# Patient Record
Sex: FEMALE | Race: WHITE
[De-identification: names, ages, dates, MRNs, and addresses within clinical notes are randomized per-mention and may not be internally consistent; named-entity substitution may affect disease eponyms.]

---

## 2018-03-28 ENCOUNTER — HOSPITAL ENCOUNTER (EMERGENCY)
Dept: HOSPITAL 52 - LL.ED | Age: 18
Discharge: HOME | End: 2018-03-28
Payer: COMMERCIAL

## 2018-03-28 DIAGNOSIS — Z88.1: ICD-10-CM

## 2018-03-28 DIAGNOSIS — Z88.5: ICD-10-CM

## 2018-03-28 DIAGNOSIS — F19.20: ICD-10-CM

## 2018-03-28 DIAGNOSIS — K21.9: ICD-10-CM

## 2018-03-28 DIAGNOSIS — E87.6: ICD-10-CM

## 2018-03-28 DIAGNOSIS — N12: Primary | ICD-10-CM

## 2018-03-28 DIAGNOSIS — F17.210: ICD-10-CM

## 2018-03-28 DIAGNOSIS — Z79.899: ICD-10-CM

## 2018-03-28 DIAGNOSIS — E66.9: ICD-10-CM

## 2018-03-28 LAB
CHLORIDE SERPL-SCNC: 99 MMOL/L (ref 98–107)
SODIUM SERPL-SCNC: 136 MMOL/L (ref 136–145)

## 2018-03-28 PROCEDURE — G0480 DRUG TEST DEF 1-7 CLASSES: HCPCS

## 2018-03-28 NOTE — EDM.PDOC
ED HPI GENERAL MEDICAL PROBLEM





- General


Chief Complaint: General


Stated Complaint: L sided swelling, "abnormal labs"


Time Seen by Provider: 03/28/18 14:00


Source of Information: Reports: Patient, Family


History Limitations: Reports: Uncooperative (vague history, not giving 

consistent answers to questions/history review. )





- History of Present Illness


INITIAL COMMENTS - FREE TEXT/NARRATIVE: 





Patient is brought to the ER by her mother for complaint of not feeling well.  

Has not been seen by her mother for approximately one month but showed up at 

home several days ago complaining of not feeling well.  Long history of drug use

/abuse/dependency.  Was in inpatient drug rehab last year.  At first she said 

for 6 months, later she said for 8 months. Reports she was discharged from 

rehab 6 months ago. 





Patient has no job.  Currently not working with a  because the 

place she usually works with is owed money by the patient. 





Mom says patient has been staying with friends/moving around to different 

homes. 


Patient claims she spends most of her time living with her grandfather. Mother 

disputes this. 





During initial ROS, patient admitted to using THC, denied other drug use. 

Admitted to drinking ETOH "on weekends''. 





Very difficult to get timeframe of current illness, such as when symptoms 

started. Patient vague, said "I don't know". Eventually she said that she was 

having symptoms for 4 days. No eye contact during initial history made by 

patient. 





She did complain of left sided mid back pain, non-radiating. Some nausea. No 

emesis/bowel changes. Uncertain if she had high fevers.  Denied taking any 

medications at home such as Tylenol or Ibuprofen to nurse. 


Also complaint of some dysuria. 


Menstrual cycle started today. 





Said PMH + for GERD, Herpes





Was seen at [facility] in [place] yesterday. She was given Rocephin IM and 

Rx for Septra. Mom says that patient "disappeared" after the appointment and 

did not  the medication. When asked why she didn't  the medicine, 

the patient told nurse it was because she was "tired". 





Negative Hep C screen yesterday. UC pending. Clinic at that time was worried 

about possible pyelonephritis





- Related Data


 Allergies











Allergy/AdvReac Type Severity Reaction Status Date / Time


 


ciprofloxacin [From Cipro] Allergy  Hives Verified 03/28/18 13:25


 


morphine Allergy  Anaphylactic Verified 03/28/18 14:02





   Shock  











Home Meds: 


 Home Meds





valACYclovir HCl [Valtrex] 500 mg PO DAILY PRN 05/27/14 [History]


Acetaminophen [Tylenol] 650 mg PO Q4HR PRN 03/28/18 [History]


Famotidine [Pepcid AC] 10 mg PO DAILY PRN 03/28/18 [History]


Ibuprofen 400 mg PO Q6HR PRN 03/28/18 [History]


Sulfamethoxazole/Trimethoprim [Septra DS] 1 tab PO BID 03/28/18 [History]











Past Medical History


Gastrointestinal History: Reports: Chronic Constipation, GERD


Psychiatric History: Reports: Addiction


Endocrine/Metabolic History: Reports: Obesity/BMI 30+





- Infectious Disease History


Infectious Disease History: Reports: Herpes





Social & Family History





- Tobacco Use


Smoking Status *Q: Current Every Day Smoker


Tobacco Use Within Last Twelve Months: Cigarettes


Years of Tobacco use: 4


Packs/Tins Daily: 0.5


Used Tobacco, but Quit: No


Smoking Cessation Information Provided To Patient: Patient Refused


Second Hand Smoke Exposure: Yes





- Alcohol Use


Alcohol Use History: Yes


Days Per Week of Alcohol Use: 1 (Denies abuse however initial intoxication at 

age 9-10 by her history)


Alcohol Use in Last Twelve Months: Yes


Alcohol Use Comment: Vague with history/frequency





- Recreational Drug Use


Recreational Drug Use: Yes


Recreational Drug Type: Reports: Cocaine, Marijuana/Hashish, Methamphetamine


Recreational Drug Use Frequency: Daily





- Living Situation & Occupation


Living situation: Reports: with Family


Occupation: Student





ED ROS GENERAL





- Review of Systems


Review Of Systems: See Below


Constitutional: Reports: Fever, Malaise, Weight Loss (Mom reports patient has 

lost weight over the last month).  Denies: Diaphoresis


HEENT: Reports: No Symptoms


Respiratory: Reports: No Symptoms.  Denies: Shortness of Breath, Wheezing, 

Pleuritic Chest Pain, Cough


Cardiovascular: Reports: No Symptoms.  Denies: Chest Pain


GI/Abdominal: Reports: Nausea.  Denies: Abdominal Pain, Black Stool, Bloody 

Stool, Difficulty Swallowing, Distension, Vomiting


: Reports: Dysuria, Flank Pain, Frequency, Hematuria, Urgency


Musculoskeletal: Reports: No Symptoms


Skin: Reports: Other (scattered small errosions/scabs over body, patient says 

due to Herpes)


Neurological: Reports: No Symptoms


Psychiatric: Reports: No Symptoms


Hematologic/Lymphatic: Reports: No Symptoms


Immunologic: Reports: No Symptoms





ED EXAM, GENERAL





- Physical Exam


Exam: See Below


Exam Limited By: No Limitations


General Appearance: Alert, Obese, Other (restless, very unkept in appearance, 

holes in clothes)


Eye Exam: Bilateral Eye: EOMI, PERRL


Ears: Normal External Exam, Normal Canal, Hearing Grossly Normal, Normal TMs


Nose: Normal Inspection.  No: Nasal Swelling, Nasal Drainage


Throat/Mouth: Normal Lips, Normal Oropharynx, Normal Voice, No Airway Compromise

, Other (no significant dental disease noted)


Head: Atraumatic, Normocephalic


Neck: Normal Inspection, Supple, Non-Tender, Full Range of Motion.  No: 

Lymphadenopathy (L), Lymphadenopathy (R)


Respiratory/Chest: No Respiratory Distress, Lungs Clear, Normal Breath Sounds, 

No Accessory Muscle Use, Chest Non-Tender


Cardiovascular: Normal Peripheral Pulses, Tachycardia


Peripheral Pulses: 2+: Radial (L), Radial (R), Dorsalis Pedis (L), Dorsalis 

Pedis (R)


GI/Abdominal: Normal Bowel Sounds, Soft, No Distention, Tender (mild tenderness 

with palpation of central abdomen).  No: Distended, Guarding, Rigid, Rebound


 (Female) Exam: Deferred


Rectal (Female) Exam: Deferred


Back Exam: CVA Tenderness (L) (mild).  No: CVA Tenderness (R), Muscle Spasm, 

Paraspinal Tenderness, Vertebral Tenderness


Extremities: Normal Inspection, Normal Range of Motion, Non-Tender, No Pedal 

Edema, Normal Capillary Refill


Neurological: Alert, Oriented, Normal Reflexes, No Motor/Sensory Deficits


Psychiatric: Anxious


Skin Exam: Warm, Dry, Rash (Scattered small round errosions/scabs noted on face/

neck/trunk/limbs bilaterally. No active drainage/pustules noted. )


Lymphatic: No Adenopathy





Course





- Vital Signs


Last Recorded V/S: 


 Last Vital Signs











Temp  37.9 C   03/28/18 15:46


 


Pulse  97   03/28/18 15:46


 


Resp  16   03/28/18 15:46


 


BP  96/52 L  03/28/18 15:46


 


Pulse Ox  100   03/28/18 15:46














- Orders/Labs/Meds


Orders: 


 Active Orders 24 hr











 Category Date Time Status


 


 Abdomen Pelvis wo Cont [CT] Stat Exams  03/28/18 15:05 Taken











Labs: 


 Laboratory Tests











  03/28/18 03/28/18 03/28/18 Range/Units





  14:01 14:15 14:15 


 


WBC   13.8 H   (4.0-10.2)  K/uL


 


RBC   4.22   (3.77-5.09)  M/uL


 


Hgb   13.0   (11.7-15.5)  g/dL


 


Hct   37.8   (34.0-46.0)  %


 


MCV   89.6   (84.0-98.0)  fL


 


MCH   30.8   (28.2-33.3)  pg


 


MCHC   34.4   (31.7-36.0)  g/dL


 


RDW   13.2   (11.2-14.1)  %


 


Plt Count   172  D   (150-350)  K/uL


 


Neut % (Auto)   81.7 H   (45.0-80.0)  %


 


Lymph % (Auto)   8.2 L   (10.0-50.0)  %


 


Mono % (Auto)   9.9   (2.0-14.0)  %


 


Eos % (Auto)   0.1   (0.0-5.0)  %


 


Baso % (Auto)   0.1   (0.0-2.0)  %


 


Neut # (Auto)   11.25 H   (1.40-7.00)  K/uL


 


Lymph # (Auto)   1.13   (0.50-3.50)  K/uL


 


Mono # (Auto)   1.36 H   (0.00-1.00)  K/uL


 


Eos # (Auto)   0.01   (0.00-0.50)  K/uL


 


Baso # (Auto)   0.02   (0.00-0.20)  K/uL


 


Sodium  136    (136-145)  mmol/L


 


Potassium  2.9 L*    (3.5-5.1)  mmol/L


 


Chloride  99    ()  mmol/L


 


Carbon Dioxide  28.0    (21.0-32.0)  mmol/L


 


BUN  6 L    (7-18)  mg/dL


 


Creatinine  0.66    (0.51-1.17)  mg/dL


 


Est Cr Clr Drug Dosing  5.09    mL/min


 


Estimated GFR (MDRD)  > 60    mL/min


 


Glucose  92    ()  mg/dL


 


Lactic Acid     (0.4-2.0)  mmol/L


 


Calcium  8.3 L    (8.5-10.1)  mg/dL


 


Total Bilirubin  0.5    (0.2-1.0)  mg/dL


 


AST  12 L    (15-37)  U/L


 


ALT  11 L    (12-78)  U/L


 


Alkaline Phosphatase  106    ()  IU/L


 


Total Protein  7.3    (6.4-8.2)  g/dL


 


Albumin  2.7 L    (3.4-5.0)  g/dL


 


Specimen Type     


 


Urine Color     


 


Urine Appearance     


 


Urine pH     (5.0-9.0)  


 


Ur Specific Gravity     (1.005-1.030)  


 


Urine Protein     (NEGATIVE)  mg/dL


 


Urine Glucose (UA)     (NEGATIVE)  mg/dL


 


Urine Ketones     (NEGATIVE)  mg/dL


 


Urine Occult Blood     (NEGATIVE)  


 


Urine Nitrite     (NEGATIVE)  


 


Urine Bilirubin     (NEGATIVE)  


 


Urine Urobilinogen     (0.2-1.0)  E.U./dL


 


Ur Leukocyte Esterase     (NEGATIVE)  


 


Urine RBC     /HPF


 


Urine WBC     /HPF


 


Ur Epithelial Cells     /LPF


 


Urine Bacteria     (NONE TO FEW)  /HPF


 


Urine HCG, Qual     


 


Urine Opiates Screen     (NEGATIVE)  


 


Ur Barbiturates Screen     (NEGATIVE)  


 


Ur Tricyclics Screen     (NEGATIVE)  


 


Ur Phencyclidine Scrn     (NEGATIVE)  


 


Ur Amphetamine Screen     (NEGATIVE)  


 


U Methamphetamines Scrn     (NEGATIVE)  


 


U Benzodiazepines Scrn     (NEGATIVE)  


 


U Cocaine Metab Screen     (NEGATIVE)  


 


U Marijuana (THC) Screen     (NEGATIVE)  


 


Ethyl Alcohol    0.000  (0.000-0.080)  g/dL














  03/28/18 03/28/18 03/28/18 Range/Units





  14:15 14:30 14:30 


 


WBC     (4.0-10.2)  K/uL


 


RBC     (3.77-5.09)  M/uL


 


Hgb     (11.7-15.5)  g/dL


 


Hct     (34.0-46.0)  %


 


MCV     (84.0-98.0)  fL


 


MCH     (28.2-33.3)  pg


 


MCHC     (31.7-36.0)  g/dL


 


RDW     (11.2-14.1)  %


 


Plt Count     (150-350)  K/uL


 


Neut % (Auto)     (45.0-80.0)  %


 


Lymph % (Auto)     (10.0-50.0)  %


 


Mono % (Auto)     (2.0-14.0)  %


 


Eos % (Auto)     (0.0-5.0)  %


 


Baso % (Auto)     (0.0-2.0)  %


 


Neut # (Auto)     (1.40-7.00)  K/uL


 


Lymph # (Auto)     (0.50-3.50)  K/uL


 


Mono # (Auto)     (0.00-1.00)  K/uL


 


Eos # (Auto)     (0.00-0.50)  K/uL


 


Baso # (Auto)     (0.00-0.20)  K/uL


 


Sodium     (136-145)  mmol/L


 


Potassium     (3.5-5.1)  mmol/L


 


Chloride     ()  mmol/L


 


Carbon Dioxide     (21.0-32.0)  mmol/L


 


BUN     (7-18)  mg/dL


 


Creatinine     (0.51-1.17)  mg/dL


 


Est Cr Clr Drug Dosing     mL/min


 


Estimated GFR (MDRD)     mL/min


 


Glucose     ()  mg/dL


 


Lactic Acid  1.4    (0.4-2.0)  mmol/L


 


Calcium     (8.5-10.1)  mg/dL


 


Total Bilirubin     (0.2-1.0)  mg/dL


 


AST     (15-37)  U/L


 


ALT     (12-78)  U/L


 


Alkaline Phosphatase     ()  IU/L


 


Total Protein     (6.4-8.2)  g/dL


 


Albumin     (3.4-5.0)  g/dL


 


Specimen Type   Urinblad   


 


Urine Color   Dark yellow   


 


Urine Appearance   Slightly cloudy   


 


Urine pH   5.5   (5.0-9.0)  


 


Ur Specific Gravity   1.020   (1.005-1.030)  


 


Urine Protein   100 H   (NEGATIVE)  mg/dL


 


Urine Glucose (UA)   Negative   (NEGATIVE)  mg/dL


 


Urine Ketones   Trace H   (NEGATIVE)  mg/dL


 


Urine Occult Blood   Large H   (NEGATIVE)  


 


Urine Nitrite   Negative   (NEGATIVE)  


 


Urine Bilirubin   Small H   (NEGATIVE)  


 


Urine Urobilinogen   1.0   (0.2-1.0)  E.U./dL


 


Ur Leukocyte Esterase   Trace H   (NEGATIVE)  


 


Urine RBC   50-75 H   /HPF


 


Urine WBC   10-20 H   /HPF


 


Ur Epithelial Cells   Few   /LPF


 


Urine Bacteria   Few   (NONE TO FEW)  /HPF


 


Urine HCG, Qual    Negative  


 


Urine Opiates Screen     (NEGATIVE)  


 


Ur Barbiturates Screen     (NEGATIVE)  


 


Ur Tricyclics Screen     (NEGATIVE)  


 


Ur Phencyclidine Scrn     (NEGATIVE)  


 


Ur Amphetamine Screen     (NEGATIVE)  


 


U Methamphetamines Scrn     (NEGATIVE)  


 


U Benzodiazepines Scrn     (NEGATIVE)  


 


U Cocaine Metab Screen     (NEGATIVE)  


 


U Marijuana (THC) Screen     (NEGATIVE)  


 


Ethyl Alcohol     (0.000-0.080)  g/dL














  03/28/18 03/28/18 Range/Units





  14:30 20:45 


 


WBC    (4.0-10.2)  K/uL


 


RBC    (3.77-5.09)  M/uL


 


Hgb    (11.7-15.5)  g/dL


 


Hct    (34.0-46.0)  %


 


MCV    (84.0-98.0)  fL


 


MCH    (28.2-33.3)  pg


 


MCHC    (31.7-36.0)  g/dL


 


RDW    (11.2-14.1)  %


 


Plt Count    (150-350)  K/uL


 


Neut % (Auto)    (45.0-80.0)  %


 


Lymph % (Auto)    (10.0-50.0)  %


 


Mono % (Auto)    (2.0-14.0)  %


 


Eos % (Auto)    (0.0-5.0)  %


 


Baso % (Auto)    (0.0-2.0)  %


 


Neut # (Auto)    (1.40-7.00)  K/uL


 


Lymph # (Auto)    (0.50-3.50)  K/uL


 


Mono # (Auto)    (0.00-1.00)  K/uL


 


Eos # (Auto)    (0.00-0.50)  K/uL


 


Baso # (Auto)    (0.00-0.20)  K/uL


 


Sodium    (136-145)  mmol/L


 


Potassium   3.1 L  (3.5-5.1)  mmol/L


 


Chloride    ()  mmol/L


 


Carbon Dioxide    (21.0-32.0)  mmol/L


 


BUN    (7-18)  mg/dL


 


Creatinine    (0.51-1.17)  mg/dL


 


Est Cr Clr Drug Dosing    mL/min


 


Estimated GFR (MDRD)    mL/min


 


Glucose    ()  mg/dL


 


Lactic Acid    (0.4-2.0)  mmol/L


 


Calcium    (8.5-10.1)  mg/dL


 


Total Bilirubin    (0.2-1.0)  mg/dL


 


AST    (15-37)  U/L


 


ALT    (12-78)  U/L


 


Alkaline Phosphatase    ()  IU/L


 


Total Protein    (6.4-8.2)  g/dL


 


Albumin    (3.4-5.0)  g/dL


 


Specimen Type    


 


Urine Color    


 


Urine Appearance    


 


Urine pH    (5.0-9.0)  


 


Ur Specific Gravity    (1.005-1.030)  


 


Urine Protein    (NEGATIVE)  mg/dL


 


Urine Glucose (UA)    (NEGATIVE)  mg/dL


 


Urine Ketones    (NEGATIVE)  mg/dL


 


Urine Occult Blood    (NEGATIVE)  


 


Urine Nitrite    (NEGATIVE)  


 


Urine Bilirubin    (NEGATIVE)  


 


Urine Urobilinogen    (0.2-1.0)  E.U./dL


 


Ur Leukocyte Esterase    (NEGATIVE)  


 


Urine RBC    /HPF


 


Urine WBC    /HPF


 


Ur Epithelial Cells    /LPF


 


Urine Bacteria    (NONE TO FEW)  /HPF


 


Urine HCG, Qual    


 


Urine Opiates Screen  Negative   (NEGATIVE)  


 


Ur Barbiturates Screen  Negative   (NEGATIVE)  


 


Ur Tricyclics Screen  Negative   (NEGATIVE)  


 


Ur Phencyclidine Scrn  Negative   (NEGATIVE)  


 


Ur Amphetamine Screen  Positive H   (NEGATIVE)  


 


U Methamphetamines Scrn  Positive H   (NEGATIVE)  


 


U Benzodiazepines Scrn  Negative   (NEGATIVE)  


 


U Cocaine Metab Screen  Negative   (NEGATIVE)  


 


U Marijuana (THC) Screen  Positive H   (NEGATIVE)  


 


Ethyl Alcohol    (0.000-0.080)  g/dL











Meds: 


Medications














Discontinued Medications














Generic Name Dose Route Start Last Admin





  Trade Name Freq  PRN Reason Stop Dose Admin


 


Acetaminophen  650 mg  03/28/18 13:45  03/28/18 13:48





  Tylenol  PO  03/28/18 13:46  650 mg





  NOW ONE   Administration





     





     





     





     


 


Ceftriaxone Sodium  1 gm  03/28/18 15:12  03/28/18 16:03





  Rocephin  IM  03/28/18 15:13  1 gm





  ONETIME ONE   Administration





     





     





     





     


 


Lidocaine HCl  Confirm  03/28/18 15:30  03/28/18 16:04





  Xylocaine-Mpf 1%  Administered  03/28/18 15:31  5 ml





  Dose   Administration





  5 ml   





  .ROUTE   





  .STK-MED ONE   





     





     





     





     


 


Lorazepam  1 mg  03/28/18 15:11  03/28/18 15:44





  Ativan  PO  03/28/18 15:12  1 mg





  ONETIME ONE   Administration





     





     





     





     


 


Potassium Chloride  40 meq  03/28/18 14:49  03/28/18 15:11





  Klor-Con M20  PO  03/28/18 14:50  40 meq





  ONETIME ONE   Administration





     





     





     





     


 


Potassium Chloride  20 meq  03/28/18 17:00  03/28/18 16:45





  Klor-Con 10  PO  03/28/18 17:01  20 meq





  ONETIME ONE   Administration





     





     





     





     


 


Potassium Chloride  20 meq  03/28/18 19:00  03/28/18 19:06





  Klor-Con 10  PO  03/28/18 19:01  20 meq





  ONETIME ONE   Administration





     





     





     





     


 


Potassium Chloride  40 meq  03/28/18 21:09  





  Klor-Con M20  PO  03/28/18 21:10  





  ONETIME ONE   





     





     





     





     














- Radiology Interpretation


Free Text/Narrative:: 





No acute findings on abdominal/pelvic CT other than some changes consistent 

with constipation per Radiology. 


CT Results Date: 03/28/18


CT Results Time: 17:00





- Re-Assessments/Exams


Free Text/Narrative Re-Assessment/Exam: 





03/28/18 16:19


+ Meth and THC.


Normal lactic acid. WBC elevated but improved compared to level noted by 

[facility] yesterday. K low at 2.9





CT of abd/pelvis ordered to rule out kidney stone. 








Patient admitted to use when confronted. Has no interest in going to treatment, 

saying she had "enough" treatment last year during her 6-8 month stint as 

inpatient and knows what to do to stay clean. 





 Discussed ongoing hematuria/pyuria and lack of compliance in picking up 

prescribed antibiotics yesterday.  Discussed low K noted today. Patient made 

aware that both low potassium and UTIs/Pyelonepritis can be lethal if left 

untreated. Patient promised that she would be compliant with treatment course. 


Refused IV.  Refused to see local  saying that she "needed to get 

out of this town and get her own place/job" and felt as though that would help 

her achieve sobriety. 





Was OK with getting IM Rocephin despite her saying that she hated needles and 

did not want an IV.


She was observed to be able to drink water easily. PO hydration encouraged.


Plan formulated for PO Potassium supplementation over 6 hours with recheck of 

Potassium level at that time. Patient wants to go home. 


Ativan given. Single lab draw planned at this time to recheck potassium at 2100 

due to patient's extreme reaction (screaming) with blood draws. She wants as 

few draws as possible. 








18:40





CT unremarkable for stone/perforation/acute changes. 


Patient resting comfortably. Requesting to eat supper. 





Patient will be discharged if potassium level is in improved range this 

evening.  


Again, it was impressed upon her that it will be of utmost importance to pick 

up her antibiotics and take them as prescribed.  She is to follow up in two 

days with [facility] to have potassium rechecked and see if 

infection is improving appropriately.  





Extensive precautions discussed with patient prior to planned discharge.  

Patient at this point in time says she has no plans on any Meth use/other 

illicit drug use in the near future. She is to follow up otherwise as needed. 




















Free Text/Narrative Re-Assessment/Exam: 





03/28/18 21:20


Patient has been resting comfortably, watching TV. Demeanor significantly 

improved.  PO fluids/food without problem. Overall feels better than she did 

yesterday morning (has now had 2 doses IM Rocephin)  Pulse rate/BP improved. 





K level now 3.1





40meq PO Potassium ordered and given to patient. 





Discussed with patient that although potassium level has improved, it is still 

low. Patient given option to stay overnight on observation with additional PO K 

and redraw in AM which she refused, saying that she would rather go home to 

spend the night at her mother's home.  Patient refused IV during stay, IV K not 

an option. 


Patient promises to come back tomorrow morning by lunch to get potassium level 

rechecked.  Plans on getting her antibiotics in the morning and then coming to 

our facility for lab work. 





Extensive precautions again reviewed prior to discharge, including  the 

potential risks of low potassium as well as continued Meth/drug use. 











Departure





- Departure


Time of Disposition: 22:00


Disposition: Home, Self-Care 01


Condition: Good


Clinical Impression: 


 Substance abuse, Methamphetamine addiction, Pyelonephritis, Hypokalemia








- Discharge Information


Instructions:  Pyelonephritis, Adult, Easy-to-Read, Hypokalemia


Referrals: 


Mindy Jordan PA-C [Primary Care Provider] - 


Forms:  ED Department Discharge


Additional Instructions: 


Go home, rest, drink plenty of fluids.





RETURN to see us TOMORROW by NOON to have your potassium level rechecked! 





It IMPORTANT to  your ANTIBIOTICS that were prescribed for you yesterday 

by [facility]. Start them immediately tomorrow morning. 





DO NOT use METH or other illegal drugs. Be aware that using such drugs could 

make it more possible to worsen your infection and chances of kidney damage. 





Call [facility] tomorrow and make an appointment for FRIDAY, two days from 

now, to be rechecked and also get your potassium rechecked.  It is is low again

, further checks will be needed as well as additional oral potassium pills 

supplements. 





Follow up otherwise as needed. 











- My Orders


Last 24 Hours: 


My Active Orders





03/28/18 15:05


Abdomen Pelvis wo Cont [CT] Stat 














- Assessment/Plan


Last 24 Hours: 


My Active Orders





03/28/18 15:05


Abdomen Pelvis wo Cont [CT] Stat

## 2019-01-22 ENCOUNTER — HOSPITAL ENCOUNTER (EMERGENCY)
Dept: HOSPITAL 52 - LL.ED | Age: 19
Discharge: HOME | End: 2019-01-22
Payer: COMMERCIAL

## 2019-01-22 DIAGNOSIS — F19.90: ICD-10-CM

## 2019-01-22 DIAGNOSIS — F17.210: ICD-10-CM

## 2019-01-22 DIAGNOSIS — F41.8: ICD-10-CM

## 2019-01-22 DIAGNOSIS — Z79.899: ICD-10-CM

## 2019-01-22 DIAGNOSIS — J06.9: Primary | ICD-10-CM

## 2019-01-22 DIAGNOSIS — Z71.6: ICD-10-CM

## 2019-01-22 RX ADMIN — METHYLPREDNISOLONE ACETATE ONE MG: 80 INJECTION, SUSPENSION INTRA-ARTICULAR; INTRALESIONAL; INTRAMUSCULAR; SOFT TISSUE at 06:56
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
## 2024-12-03 ENCOUNTER — HOSPITAL ENCOUNTER (EMERGENCY)
Dept: HOSPITAL 50 - VM.ED | Age: 24
Discharge: HOME | End: 2024-12-03
Payer: MEDICAID

## 2024-12-03 DIAGNOSIS — S01.01XA: Primary | ICD-10-CM

## 2024-12-03 DIAGNOSIS — E66.9: ICD-10-CM

## 2024-12-03 DIAGNOSIS — Z88.1: ICD-10-CM

## 2024-12-03 DIAGNOSIS — Z88.5: ICD-10-CM
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
## 2024-12-03 PROCEDURE — 70486 CT MAXILLOFACIAL W/O DYE: CPT

## 2024-12-03 PROCEDURE — 70450 CT HEAD/BRAIN W/O DYE: CPT

## 2024-12-03 PROCEDURE — 12001 RPR S/N/AX/GEN/TRNK 2.5CM/<: CPT

## 2024-12-03 PROCEDURE — 99284 EMERGENCY DEPT VISIT MOD MDM: CPT

## 2024-12-03 PROCEDURE — 99283 EMERGENCY DEPT VISIT LOW MDM: CPT
